# Patient Record
Sex: MALE | URBAN - METROPOLITAN AREA
[De-identification: names, ages, dates, MRNs, and addresses within clinical notes are randomized per-mention and may not be internally consistent; named-entity substitution may affect disease eponyms.]

---

## 2023-09-06 ENCOUNTER — OFFICE VISIT (OUTPATIENT)
Dept: PLASTIC SURGERY | Facility: CLINIC | Age: 33
End: 2023-09-06

## 2023-09-06 VITALS
SYSTOLIC BLOOD PRESSURE: 155 MMHG | DIASTOLIC BLOOD PRESSURE: 101 MMHG | HEART RATE: 88 BPM | TEMPERATURE: 98.2 F | HEIGHT: 68 IN | BODY MASS INDEX: 28.79 KG/M2 | WEIGHT: 190 LBS

## 2023-09-06 DIAGNOSIS — D17.21 LIPOMA OF BOTH UPPER EXTREMITIES: Primary | ICD-10-CM

## 2023-09-06 DIAGNOSIS — D17.22 LIPOMA OF BOTH UPPER EXTREMITIES: Primary | ICD-10-CM

## 2023-09-07 NOTE — PROGRESS NOTES
Assessment/Plan:    Patient is a 79-year-old male who presents as a new patient to our office for evaluation of several soft tissue masses of his bilateral upper extremities, lower extremities and trunk. Please see HPI. I discussed surgical excision of soft tissue masses of the left upper extremity x2, right upper extremity x3, and anterior trunk x2. Patient does have other soft tissue masses that he would like removed in the future. Patient understood and agreed. This will be done under general anesthesia. We will obtain an ultrasound of the bilateral elbows prior to surgery to assess for joint involvement. Discussed options, including forgoing surgery, as well as benefits and risks of surgery including but not limited to anesthesia, bleeding, infection, scarring and potential need for additional procedures. Consent was obtained and all questions answered to their satisfaction. We will plan for surgery at their earliest convenience. No problem-specific Assessment & Plan notes found for this encounter. Diagnoses and all orders for this visit:    Lipoma of both upper extremities  -     US MSK limited; Future          Subjective:      Patient ID: Dora Wheat is a 28 y.o. male. HPI     Patient presents to the office today as a new patient due to evaluation of multiple lipomas of the upper and lower extremities, and trunk. Patient reports that he has had multiple soft tissue masses for the past several years. He did have excision of some lipomas of the bilateral upper extremities approximately 15 years ago, but they returned. All lesions are nonpainful, though some are bothersome due to location. No drainage, no overlying skin changes. Patient is most bothered by 2 masses on his left upper extremity, near the left elbow, and 3 masses of the right upper extremity, also near the elbow, and 2 masses on the anterior trunk/upper abdomen. Please see photos in media.     On evaluation, the patient has multiple soft, semimobile soft tissue masses throughout his extremities and trunk. Please see photos in media. The following portions of the patient's history were reviewed and updated as appropriate:   He  has no past medical history on file. He   Patient Active Problem List    Diagnosis Date Noted   • Lipoma of both upper extremities 09/06/2023     He  has no past surgical history on file. His family history is not on file. He  reports that he has never smoked. He has never used smokeless tobacco. He reports current alcohol use. He reports that he does not use drugs. No current outpatient medications on file. No current facility-administered medications for this visit. No current outpatient medications on file prior to visit. No current facility-administered medications on file prior to visit. He has No Known Allergies. .    Review of Systems    A 12 point ROS was completed and is negative except as per HPI     Objective:      BP (!) 155/101   Pulse 88   Temp 98.2 °F (36.8 °C)   Ht 5' 8" (1.727 m)   Wt 86.2 kg (190 lb)   BMI 28.89 kg/m²          Physical Exam  Vitals and nursing note reviewed. Constitutional:       General: He is not in acute distress. Appearance: Normal appearance. He is normal weight. He is not ill-appearing, toxic-appearing or diaphoretic. HENT:      Head: Normocephalic and atraumatic. Nose: Nose normal.      Mouth/Throat:      Mouth: Mucous membranes are moist.   Eyes:      Extraocular Movements: Extraocular movements intact. Conjunctiva/sclera: Conjunctivae normal.      Pupils: Pupils are equal, round, and reactive to light. Cardiovascular:      Rate and Rhythm: Normal rate and regular rhythm. Pulmonary:      Effort: Pulmonary effort is normal. No respiratory distress. Breath sounds: Normal breath sounds. Abdominal:      General: Abdomen is flat. Palpations: Abdomen is soft.    Musculoskeletal:         General: No swelling, tenderness, deformity or signs of injury. Normal range of motion. Cervical back: Normal range of motion and neck supple. No rigidity or tenderness. Right lower leg: No edema. Left lower leg: No edema. Comments: See HPI   Skin:     General: Skin is warm and dry. Comments: See HPI   Neurological:      General: No focal deficit present. Mental Status: He is alert and oriented to person, place, and time. Cranial Nerves: No cranial nerve deficit. Sensory: No sensory deficit. Motor: No weakness.    Psychiatric:         Mood and Affect: Mood normal.         Behavior: Behavior normal.

## 2023-09-08 ENCOUNTER — PREP FOR PROCEDURE (OUTPATIENT)
Dept: PLASTIC SURGERY | Facility: CLINIC | Age: 33
End: 2023-09-08

## 2023-09-08 DIAGNOSIS — D17.1 LIPOMA OF SKIN AND SUBCUTANEOUS TISSUE OF TRUNK: ICD-10-CM

## 2023-09-08 DIAGNOSIS — D17.22 LIPOMA OF BOTH UPPER EXTREMITIES: Primary | ICD-10-CM

## 2023-09-08 DIAGNOSIS — D17.21 LIPOMA OF BOTH UPPER EXTREMITIES: Primary | ICD-10-CM

## 2023-10-23 ENCOUNTER — HOSPITAL ENCOUNTER (OUTPATIENT)
Dept: ULTRASOUND IMAGING | Facility: HOSPITAL | Age: 33
Discharge: HOME | End: 2023-10-23
Payer: COMMERCIAL

## 2023-10-23 DIAGNOSIS — D17.22 LIPOMA OF BOTH UPPER EXTREMITIES: ICD-10-CM

## 2023-10-23 DIAGNOSIS — D17.21 LIPOMA OF BOTH UPPER EXTREMITIES: ICD-10-CM

## 2023-10-23 PROCEDURE — 76882 US LMTD JT/FCL EVL NVASC XTR: CPT

## 2023-10-26 ENCOUNTER — APPOINTMENT (OUTPATIENT)
Dept: LAB | Facility: MEDICAL CENTER | Age: 33
End: 2023-10-26
Payer: COMMERCIAL

## 2023-10-26 DIAGNOSIS — D17.21 LIPOMA OF BOTH UPPER EXTREMITIES: ICD-10-CM

## 2023-10-26 DIAGNOSIS — D17.1 LIPOMA OF SKIN AND SUBCUTANEOUS TISSUE OF TRUNK: ICD-10-CM

## 2023-10-26 DIAGNOSIS — D17.22 LIPOMA OF BOTH UPPER EXTREMITIES: ICD-10-CM

## 2023-10-26 LAB
ANION GAP SERPL CALCULATED.3IONS-SCNC: 9 MMOL/L
BASOPHILS # BLD AUTO: 0.02 THOUSANDS/ÂΜL (ref 0–0.1)
BASOPHILS NFR BLD AUTO: 0 % (ref 0–1)
BUN SERPL-MCNC: 21 MG/DL (ref 5–25)
CALCIUM SERPL-MCNC: 9.8 MG/DL (ref 8.4–10.2)
CHLORIDE SERPL-SCNC: 104 MMOL/L (ref 96–108)
CO2 SERPL-SCNC: 26 MMOL/L (ref 21–32)
CREAT SERPL-MCNC: 1.25 MG/DL (ref 0.6–1.3)
EOSINOPHIL # BLD AUTO: 0.05 THOUSAND/ÂΜL (ref 0–0.61)
EOSINOPHIL NFR BLD AUTO: 1 % (ref 0–6)
ERYTHROCYTE [DISTWIDTH] IN BLOOD BY AUTOMATED COUNT: 12.2 % (ref 11.6–15.1)
GFR SERPL CREATININE-BSD FRML MDRD: 74 ML/MIN/1.73SQ M
GLUCOSE SERPL-MCNC: 112 MG/DL (ref 65–140)
HCT VFR BLD AUTO: 46 % (ref 36.5–49.3)
HGB BLD-MCNC: 14.7 G/DL (ref 12–17)
IMM GRANULOCYTES # BLD AUTO: 0.01 THOUSAND/UL (ref 0–0.2)
IMM GRANULOCYTES NFR BLD AUTO: 0 % (ref 0–2)
LYMPHOCYTES # BLD AUTO: 1.98 THOUSANDS/ÂΜL (ref 0.6–4.47)
LYMPHOCYTES NFR BLD AUTO: 44 % (ref 14–44)
MCH RBC QN AUTO: 28.1 PG (ref 26.8–34.3)
MCHC RBC AUTO-ENTMCNC: 32 G/DL (ref 31.4–37.4)
MCV RBC AUTO: 88 FL (ref 82–98)
MONOCYTES # BLD AUTO: 0.33 THOUSAND/ÂΜL (ref 0.17–1.22)
MONOCYTES NFR BLD AUTO: 7 % (ref 4–12)
NEUTROPHILS # BLD AUTO: 2.12 THOUSANDS/ÂΜL (ref 1.85–7.62)
NEUTS SEG NFR BLD AUTO: 48 % (ref 43–75)
NRBC BLD AUTO-RTO: 0 /100 WBCS
PLATELET # BLD AUTO: 220 THOUSANDS/UL (ref 149–390)
PMV BLD AUTO: 10.9 FL (ref 8.9–12.7)
POTASSIUM SERPL-SCNC: 4.1 MMOL/L (ref 3.5–5.3)
RBC # BLD AUTO: 5.24 MILLION/UL (ref 3.88–5.62)
SODIUM SERPL-SCNC: 139 MMOL/L (ref 135–147)
WBC # BLD AUTO: 4.51 THOUSAND/UL (ref 4.31–10.16)

## 2023-10-26 PROCEDURE — 80048 BASIC METABOLIC PNL TOTAL CA: CPT

## 2023-10-26 PROCEDURE — 85025 COMPLETE CBC W/AUTO DIFF WBC: CPT

## 2023-10-26 PROCEDURE — 36415 COLL VENOUS BLD VENIPUNCTURE: CPT

## 2023-10-31 RX ORDER — OMEGA-3/DHA/EPA/FISH OIL 60 MG-90MG
CAPSULE ORAL
COMMUNITY

## 2023-10-31 RX ORDER — LORATADINE 10 MG/1
10 TABLET ORAL DAILY
COMMUNITY

## 2023-10-31 NOTE — PRE-PROCEDURE INSTRUCTIONS
Pre-Surgery Instructions:   Medication Instructions    loratadine (CLARITIN) 10 mg tablet Uses PRN- OK to take day of surgery    Multiple Vitamin (MULTIVITAMIN ADULT PO) Stop taking 7 days prior to surgery. Omega-3 Fatty Acids (Fish Oil) 500 MG CAPS Stop taking 7 days prior to surgery. Medication instructions for day surgery reviewed. Please use only a sip of water to take your instructed medications. Avoid all over the counter vitamins, supplements and NSAIDS for one week prior to surgery per anesthesia guidelines. Tylenol is ok to take as needed. You will receive a call one business day prior to surgery with an arrival time and hospital directions. If your surgery is scheduled on a Monday, the hospital will be calling you on the Friday prior to your surgery. If you have not heard from anyone by 8pm, please call the hospital supervisor through the hospital  at [phone]. Dai Barry [phone]). Do not eat or drink anything after midnight the night before your surgery, including candy, mints, lifesavers, or chewing gum. Do not drink alcohol 24hrs before your surgery. Try not to smoke at least 24hrs before your surgery. Follow the pre surgery showering instructions as listed in the [facility] Surgical Experience Booklet” or otherwise provided by your surgeon's office. Do not use a blade to shave the surgical area 1 week before surgery. It is okay to use a clean electric clippers up to 24 hours before surgery. Do not apply any lotions, creams, including makeup, cologne, deodorant, or perfumes after showering on the day of your surgery. Do not use dry shampoo, hair spray, hair gel, or any type of hair products. No contact lenses, eye make-up, or artificial eyelashes. Remove nail polish, including gel polish, and any artificial, gel, or acrylic nails if possible. Remove all jewelry including rings and body piercing jewelry. Wear causal clothing that is easy to take on and off.  Consider your type of surgery. Keep any valuables, jewelry, piercings at home. Please bring any specially ordered equipment (sling, braces) if indicated. Arrange for a responsible person to drive you to and from the hospital on the day of your surgery. Visitor Guidelines discussed. Call the surgeon's office with any new illnesses, exposures, or additional questions prior to surgery. Please reference your [facility] Surgical Experience Booklet” for additional information to prepare for your upcoming surgery.

## 2023-11-06 ENCOUNTER — ANESTHESIA EVENT (OUTPATIENT)
Dept: PERIOP | Facility: HOSPITAL | Age: 33
End: 2023-11-06
Payer: COMMERCIAL

## 2023-11-06 PROCEDURE — NC001 PR NO CHARGE: Performed by: PHYSICIAN ASSISTANT

## 2023-11-07 ENCOUNTER — ANESTHESIA (OUTPATIENT)
Dept: PERIOP | Facility: HOSPITAL | Age: 33
End: 2023-11-07
Payer: COMMERCIAL

## 2023-11-07 ENCOUNTER — HOSPITAL ENCOUNTER (OUTPATIENT)
Facility: HOSPITAL | Age: 33
Setting detail: OUTPATIENT SURGERY
Discharge: HOME | End: 2023-11-07
Attending: STUDENT IN AN ORGANIZED HEALTH CARE EDUCATION/TRAINING PROGRAM | Admitting: STUDENT IN AN ORGANIZED HEALTH CARE EDUCATION/TRAINING PROGRAM
Payer: COMMERCIAL

## 2023-11-07 VITALS
HEIGHT: 68 IN | HEART RATE: 78 BPM | SYSTOLIC BLOOD PRESSURE: 150 MMHG | RESPIRATION RATE: 16 BRPM | TEMPERATURE: 98.4 F | WEIGHT: 189.6 LBS | DIASTOLIC BLOOD PRESSURE: 77 MMHG | BODY MASS INDEX: 28.73 KG/M2 | OXYGEN SATURATION: 97 %

## 2023-11-07 DIAGNOSIS — D17.1 LIPOMA OF SKIN AND SUBCUTANEOUS TISSUE OF TRUNK: ICD-10-CM

## 2023-11-07 DIAGNOSIS — D17.21 LIPOMA OF BOTH UPPER EXTREMITIES: ICD-10-CM

## 2023-11-07 DIAGNOSIS — D17.22 LIPOMA OF BOTH UPPER EXTREMITIES: ICD-10-CM

## 2023-11-07 PROCEDURE — 88304 TISSUE EXAM BY PATHOLOGIST: CPT | Performed by: PATHOLOGY

## 2023-11-07 PROCEDURE — 21552 EXC NECK LES SC 3 CM/>: CPT | Performed by: PHYSICIAN ASSISTANT

## 2023-11-07 PROCEDURE — 25075 EXC FOREARM LES SC < 3 CM: CPT | Performed by: PHYSICIAN ASSISTANT

## 2023-11-07 PROCEDURE — 24075 EXC ARM/ELBOW LES SC < 3 CM: CPT | Performed by: PHYSICIAN ASSISTANT

## 2023-11-07 PROCEDURE — 24075 EXC ARM/ELBOW LES SC < 3 CM: CPT | Performed by: STUDENT IN AN ORGANIZED HEALTH CARE EDUCATION/TRAINING PROGRAM

## 2023-11-07 PROCEDURE — 25075 EXC FOREARM LES SC < 3 CM: CPT | Performed by: STUDENT IN AN ORGANIZED HEALTH CARE EDUCATION/TRAINING PROGRAM

## 2023-11-07 PROCEDURE — 25071 EXC FOREARM LES SC 3 CM/>: CPT | Performed by: PHYSICIAN ASSISTANT

## 2023-11-07 PROCEDURE — 24071 EXC ARM/ELBOW LES SC 3 CM/>: CPT | Performed by: STUDENT IN AN ORGANIZED HEALTH CARE EDUCATION/TRAINING PROGRAM

## 2023-11-07 PROCEDURE — 22903 EXC ABD LES SC 3 CM/>: CPT | Performed by: STUDENT IN AN ORGANIZED HEALTH CARE EDUCATION/TRAINING PROGRAM

## 2023-11-07 PROCEDURE — 21552 EXC NECK LES SC 3 CM/>: CPT | Performed by: STUDENT IN AN ORGANIZED HEALTH CARE EDUCATION/TRAINING PROGRAM

## 2023-11-07 PROCEDURE — NC001 PR NO CHARGE: Performed by: STUDENT IN AN ORGANIZED HEALTH CARE EDUCATION/TRAINING PROGRAM

## 2023-11-07 PROCEDURE — 24071 EXC ARM/ELBOW LES SC 3 CM/>: CPT | Performed by: PHYSICIAN ASSISTANT

## 2023-11-07 PROCEDURE — 25071 EXC FOREARM LES SC 3 CM/>: CPT | Performed by: STUDENT IN AN ORGANIZED HEALTH CARE EDUCATION/TRAINING PROGRAM

## 2023-11-07 PROCEDURE — 22903 EXC ABD LES SC 3 CM/>: CPT | Performed by: PHYSICIAN ASSISTANT

## 2023-11-07 RX ORDER — ACETAMINOPHEN 325 MG/1
975 TABLET ORAL ONCE
Status: COMPLETED | OUTPATIENT
Start: 2023-11-07 | End: 2023-11-07

## 2023-11-07 RX ORDER — HYDROMORPHONE HCL/PF 1 MG/ML
SYRINGE (ML) INJECTION AS NEEDED
Status: DISCONTINUED | OUTPATIENT
Start: 2023-11-07 | End: 2023-11-07

## 2023-11-07 RX ORDER — GABAPENTIN 300 MG/1
300 CAPSULE ORAL ONCE
Status: COMPLETED | OUTPATIENT
Start: 2023-11-07 | End: 2023-11-07

## 2023-11-07 RX ORDER — MIDAZOLAM HYDROCHLORIDE 2 MG/2ML
INJECTION, SOLUTION INTRAMUSCULAR; INTRAVENOUS AS NEEDED
Status: DISCONTINUED | OUTPATIENT
Start: 2023-11-07 | End: 2023-11-07

## 2023-11-07 RX ORDER — KETOROLAC TROMETHAMINE 30 MG/ML
INJECTION, SOLUTION INTRAMUSCULAR; INTRAVENOUS AS NEEDED
Status: DISCONTINUED | OUTPATIENT
Start: 2023-11-07 | End: 2023-11-07

## 2023-11-07 RX ORDER — LIDOCAINE HYDROCHLORIDE 10 MG/ML
0.5 INJECTION, SOLUTION EPIDURAL; INFILTRATION; INTRACAUDAL; PERINEURAL ONCE AS NEEDED
Status: DISCONTINUED | OUTPATIENT
Start: 2023-11-07 | End: 2023-11-07 | Stop reason: HOSPADM

## 2023-11-07 RX ORDER — DEXAMETHASONE SODIUM PHOSPHATE 10 MG/ML
INJECTION, SOLUTION INTRAMUSCULAR; INTRAVENOUS AS NEEDED
Status: DISCONTINUED | OUTPATIENT
Start: 2023-11-07 | End: 2023-11-07

## 2023-11-07 RX ORDER — CEFAZOLIN SODIUM 2 G/50ML
2000 SOLUTION INTRAVENOUS ONCE
Status: COMPLETED | OUTPATIENT
Start: 2023-11-07 | End: 2023-11-07

## 2023-11-07 RX ORDER — HYDROMORPHONE HCL/PF 1 MG/ML
0.5 SYRINGE (ML) INJECTION
Status: CANCELLED | OUTPATIENT
Start: 2023-11-07

## 2023-11-07 RX ORDER — TRAMADOL HYDROCHLORIDE 50 MG/1
50 TABLET ORAL EVERY 6 HOURS PRN
Status: CANCELLED | OUTPATIENT
Start: 2023-11-07

## 2023-11-07 RX ORDER — LABETALOL HYDROCHLORIDE 5 MG/ML
10 INJECTION, SOLUTION INTRAVENOUS
Status: DISCONTINUED | OUTPATIENT
Start: 2023-11-07 | End: 2023-11-07 | Stop reason: HOSPADM

## 2023-11-07 RX ORDER — SODIUM CHLORIDE, SODIUM LACTATE, POTASSIUM CHLORIDE, CALCIUM CHLORIDE 600; 310; 30; 20 MG/100ML; MG/100ML; MG/100ML; MG/100ML
125 INJECTION, SOLUTION INTRAVENOUS CONTINUOUS
Status: DISCONTINUED | OUTPATIENT
Start: 2023-11-07 | End: 2023-11-07 | Stop reason: HOSPADM

## 2023-11-07 RX ORDER — ONDANSETRON 2 MG/ML
INJECTION INTRAMUSCULAR; INTRAVENOUS AS NEEDED
Status: DISCONTINUED | OUTPATIENT
Start: 2023-11-07 | End: 2023-11-07

## 2023-11-07 RX ORDER — FENTANYL CITRATE 50 UG/ML
INJECTION, SOLUTION INTRAMUSCULAR; INTRAVENOUS AS NEEDED
Status: DISCONTINUED | OUTPATIENT
Start: 2023-11-07 | End: 2023-11-07

## 2023-11-07 RX ORDER — ONDANSETRON 2 MG/ML
4 INJECTION INTRAMUSCULAR; INTRAVENOUS ONCE AS NEEDED
Status: CANCELLED | OUTPATIENT
Start: 2023-11-07

## 2023-11-07 RX ORDER — LIDOCAINE HYDROCHLORIDE 20 MG/ML
INJECTION, SOLUTION EPIDURAL; INFILTRATION; INTRACAUDAL; PERINEURAL AS NEEDED
Status: DISCONTINUED | OUTPATIENT
Start: 2023-11-07 | End: 2023-11-07

## 2023-11-07 RX ORDER — PROPOFOL 10 MG/ML
INJECTION, EMULSION INTRAVENOUS AS NEEDED
Status: DISCONTINUED | OUTPATIENT
Start: 2023-11-07 | End: 2023-11-07

## 2023-11-07 RX ORDER — TRAMADOL HYDROCHLORIDE 50 MG/1
50 TABLET ORAL EVERY 8 HOURS PRN
Qty: 15 TABLET | Refills: 0 | Status: SHIPPED | OUTPATIENT
Start: 2023-11-07

## 2023-11-07 RX ADMIN — KETOROLAC TROMETHAMINE 15 MG: 30 INJECTION, SOLUTION INTRAMUSCULAR; INTRAVENOUS at 09:01

## 2023-11-07 RX ADMIN — ACETAMINOPHEN 975 MG: 325 TABLET ORAL at 06:30

## 2023-11-07 RX ADMIN — HYDROMORPHONE HYDROCHLORIDE 0.5 MG: 1 INJECTION, SOLUTION INTRAMUSCULAR; INTRAVENOUS; SUBCUTANEOUS at 08:51

## 2023-11-07 RX ADMIN — FENTANYL CITRATE 50 MCG: 50 INJECTION, SOLUTION INTRAMUSCULAR; INTRAVENOUS at 07:51

## 2023-11-07 RX ADMIN — PROPOFOL 250 MG: 10 INJECTION, EMULSION INTRAVENOUS at 07:39

## 2023-11-07 RX ADMIN — MIDAZOLAM 2 MG: 1 INJECTION INTRAMUSCULAR; INTRAVENOUS at 07:33

## 2023-11-07 RX ADMIN — GABAPENTIN 300 MG: 300 CAPSULE ORAL at 06:30

## 2023-11-07 RX ADMIN — LABETALOL HYDROCHLORIDE 10 MG: 5 INJECTION, SOLUTION INTRAVENOUS at 10:45

## 2023-11-07 RX ADMIN — CEFAZOLIN SODIUM 2000 MG: 2 SOLUTION INTRAVENOUS at 07:45

## 2023-11-07 RX ADMIN — SODIUM CHLORIDE, SODIUM LACTATE, POTASSIUM CHLORIDE, AND CALCIUM CHLORIDE: .6; .31; .03; .02 INJECTION, SOLUTION INTRAVENOUS at 07:36

## 2023-11-07 RX ADMIN — ONDANSETRON 4 MG: 2 INJECTION INTRAMUSCULAR; INTRAVENOUS at 09:01

## 2023-11-07 RX ADMIN — FENTANYL CITRATE 50 MCG: 50 INJECTION, SOLUTION INTRAMUSCULAR; INTRAVENOUS at 07:39

## 2023-11-07 RX ADMIN — PROPOFOL 70 MG: 10 INJECTION, EMULSION INTRAVENOUS at 07:45

## 2023-11-07 RX ADMIN — HYDROMORPHONE HYDROCHLORIDE 0.5 MG: 1 INJECTION, SOLUTION INTRAMUSCULAR; INTRAVENOUS; SUBCUTANEOUS at 08:59

## 2023-11-07 RX ADMIN — LIDOCAINE HYDROCHLORIDE 100 MG: 20 INJECTION, SOLUTION EPIDURAL; INFILTRATION; INTRACAUDAL; PERINEURAL at 07:39

## 2023-11-07 RX ADMIN — DEXAMETHASONE SODIUM PHOSPHATE 10 MG: 10 INJECTION, SOLUTION INTRAMUSCULAR; INTRAVENOUS at 07:47

## 2023-11-07 NOTE — DISCHARGE INSTR - AVS FIRST PAGE
Surgery Date: 11/7/2023                Patient: Jared Carrillo  Surgeon: Dr. Carrie Brown     Postoperative Instructions for Outpatient Surgery  Excision of Lesion/Mass     Dressings:  [x] Skin glue was applied to your incision over absorbable sutures. You may feel small pieces of suture at the ends of your incision. [] Incision is closed with nonabsorbable sutures. [x] Remove dressing the first morning following your surgery and bathe as directed. [x] No dressings are required but you may cover the incision with band-aid or gauze for comfort.  [] Apply bacitracin or other antibiotic ointment to incision and cover with band-aid or gauze. [] Leave dressing in place until your follow up appointment. [] Other instructions:      Bathing:  [x] Shower 24 hours after surgery. Allow soap and water to gently wash over the incision. No scrubbing. Gently pat dry and apply dressing as needed/instructed above.  [] Keep incision/dressing dry until your follow up appointment. [x] No submerging incision in bathtub, pool, hot tub and/or lake. Activity:  [x] No heavy lifting (> 10lbs). [x] No strenuous exercise.  [] Walking is permitted and encouraged. [] Strict sun avoidance/protection of incision site. [] Other instructions:      Medication:  [x] Resume preoperative medications. [x] Ok to use Tylenol 650 every 8 hours for pain control. You may also use ibuprofen 48 hours after surgery. Add Tramadol as needed. [] Finish all antibiotics as prescribed.  [] You may not drive until off your pain medications. [x] Apply ice to area as needed for pain. Do not place ice directly on skin. [] Other instructions: It is expected to have some bruising, swelling and mild oozing at the incision site and the surrounding area. If there is more than you expect or you suspect an infection, please call the office. Some patients may experience a low-grade fever after surgery. If it is above 100.4, please call the office. If you do not have a postoperative office appointment scheduled, please call the office today and let the staff know Dr. Dannie Rivero PA needs to see you in 10-14  days. Please call [phone] with any questions, concerns or changes.

## 2023-11-07 NOTE — OP NOTE
OPERATIVE REPORT  PATIENT NAME: Colin Rutherford    :  1990  MRN: [id number]  Pt Location: [facility] OR ROOM 01    SURGERY DATE: 2023    Surgeon(s) and Role:     * Phill Glover MD - Primary     * Mckenna Hernandez PA-C - Assisting    Preop Diagnosis:  Lipoma of both upper extremities [D17.21, D17.22]  Lipoma of skin and subcutaneous tissue of trunk [D17.1]    Post-Op Diagnosis Codes:     * Lipoma of both upper extremities [D17.21, D17.22]     * Lipoma of skin and subcutaneous tissue of trunk [D17.1]    Procedure(s):  Excision of multiple,deep subcutaneous soft tissue tumors (lipomas) on bilateral upper extremities and anterior trunk all through separate incisions with sizes as noted below  -Right upper extremity:    -Lesion 1: forearm, size 3.2x2.4 cm    -Lesion 2: forearm, size 3.1x2.8 cm   -Lesion 3: forearm, size 2.8x1.8 cm   -Lesion 4: forearm, size 4.3x2.8 cm   -Lesion 5: forearm, size 5.2x3.8 cm   -Lesion 6: elbow, size 2.2x1.2 cm  -Left upper extremity:   -Lesion 1: forearm, size 5.1x3.8 cm   -Lesion 2: forearm, size 3.4x3.5 cm   -Lesion 3: forearm, size 3.0x2.9 cm   -Lesion 4: forearm, size 2.1x1.4 cm   -Lesion 5: elbow: multiloculated, removed in pieces (size 4.3x3.5 cm, 3.1x2.4 cm, 4.6x3.4 cm, total size 12. 0x9.3 cm)   -Lesion 6: forearm: size 2.1x1.5 cm  -Anterior trunk:   -Lesion 1: size 3.8x3.2 cm  -Lesion 1: size 3.5x2.9 cm    Specimen(s):  ID Type Source Tests Collected by Time Destination   1 : Masses x 6 right forearm Tissue Soft Tissue, Other TISSUE EXAM Phill Glover MD 2023 0825    2 : Masses abdomen x 2 Tissue Soft Tissue, Other TISSUE EXAM Phill Glover MD 2023 0831    3 : Masses x 9 left forearm Tissue Soft Tissue, Other TISSUE EXAM Phill Glover MD 2023 2311        Estimated Blood Loss:   Minimal    Drains:  * No LDAs found *    Anesthesia Type:   General    Operative Indications:  Lipoma of both upper extremities [D17.21, D17.22]  Lipomas of skin and subcutaneous tissue of trunk [D17.1]      Operative Findings:  Multiple well demarcated deep subcutaneous soft tissue tumors (lipomas) with noted sizes above located in the elbow region, forearms, and anterior trunk  Total of 30 cc of 1% lidocaine with epi was utilized     Complications:   None    Procedure and Technique:  Patient was brought to the operating room, transferred to the operating table in supine fashion. After undergoing general anesthesia, a timeout was performed at which point all patient identifiers were deemed to be correct. The abdomen and bilateral upper extremities were prepped and draped in the normal sterile fashion. I first began by marking a longitudinal incision overlying each of the lesions. A total of 30 cc of 1% lidocaine with epi was used in total to locally infiltrate all of the incisions. After allowing for the epi to take effect, incisions were made and dissection through skin, superficial and deep subcutaneous tissue, until the soft tissue tumors were encountered which were all well-demarcated lipomas. The lipomas were dissected free from surrounding tissue and removed and sent for routine pathology. Each of the operative fields were irrigated and hemostasis achieved with electrocautery. I then proceeded with closure with 3-0 vicryl for dermis, 4-0 monocryl for skin, followed by exofin skin glue. The sizes and locations of each of the deep subcutaneous soft tissue tumors (lipomas) are noted above. This concluded the procedure. Patient tolerated the procedure well without complications. At the end of the case, all sponge, needle, and instrument counts were correct. Patient was awakened from anesthesia and taken to the PACU in stable condition.     I was present for the entire procedure, A qualified resident physician was not available and A physician assistant was required during the procedure for retraction, tissue handling, dissection and suturing       I was present for the entire procedure.     Patient Disposition:  PACU     This procedure was not performed to treat primary cutaneous melanoma through wide local excision      SIGNATURE: Sammy Kendrick MD  DATE: November 7, 2023  TIME: 9:58 AM

## 2023-11-07 NOTE — ANESTHESIA POSTPROCEDURE EVALUATION
Post-Op Assessment Note    CV Status:  Stable  Pain Score: 0    Pain management: adequate  Multimodal analgesia used between 6 hours prior to anesthesia start to PACU discharge    Mental Status:  Awake   Hydration Status:  Stable   PONV Controlled:  None   Airway Patency:  Patent      Post Op Vitals Reviewed: Yes      Staff: CRNA         No notable events documented.     BP   168/78   Temp 98.4   Pulse 98   Resp 12   SpO2 99%

## 2023-11-07 NOTE — ANESTHESIA PREPROCEDURE EVALUATION
Procedure:  EXCISION OF SOFT TISSUE MASS OF THE BILATERAL FOREARM X 5 WITH COMPLEX CLOSURE. (Bilateral: Arm)  EXCISION ANTERIOR TRUNK X 2  WITH COMPLEX CLOSURE. (Chest)    Relevant Problems   No relevant active problems        Physical Exam    Airway    Mallampati score: II  TM Distance: >3 FB  Neck ROM: full     Dental   No notable dental hx     Cardiovascular      Pulmonary      Other Findings        Anesthesia Plan  ASA Score- 1     Anesthesia Type- general with ASA Monitors. Additional Monitors:     Airway Plan: LMA. Plan Factors-Exercise tolerance (METS): <4 METS. Chart reviewed. Patient is a current smoker (Marijuana daily). Induction- intravenous. Postoperative Plan-     Informed Consent- Anesthetic plan and risks discussed with patient and mother. I personally reviewed this patient with the CRNA. Discussed and agreed on the Anesthesia Plan with the CRNA. Chiqui Simms

## 2023-11-07 NOTE — H&P
H&P - Plastic Surgery   Darlyn Ann 28 y.o. male MRN: [id number]  Unit/Bed#:  Encounter: [id number]           Assessment:  Lipoma of both upper extremities [D17.21, D17.22]       Lipoma of skin and subcutaneous tissue of trunk [D17.1]   Plan:   EXCISION OF SOFT TISSUE MASS OF THE BILATERAL FOREARM X 5 WITH COMPLEX CLOSURE. (Bilateral: Arm)      EXCISION ANTERIOR TRUNK X 2  WITH COMPLEX CLOSURE. (Chest)   Anesthesia type: General           HPI:   Darlyn Ann is a 28y.o. year old male who presents with multiple lipomas of the upper and lower extremities, and trunk. Patient reports that he has had multiple soft tissue masses for the past several years. He did have excision of some lipomas of the bilateral upper extremities approximately 15 years ago, but they returned. All lesions are nonpainful, though some are bothersome due to location. No drainage, no overlying skin changes. Patient is most bothered by 2 masses on his left upper extremity, near the left elbow, and 3 masses of the right upper extremity, also near the elbow, and 2 masses on the anterior trunk/upper abdomen. REVIEW OF SYSTEMS    GENERAL/CONSTITUTIONAL: The patient denies fever, fatigue, weakness, weight gain or weight loss. HEAD, EYES, EARS, NOSE AND THROAT: Eyes - The patient denies pain, redness, loss of vision, double or blurred vision and denies wearing glasses. The patient denies ringing in the ears, nosebleeds sinusitis, post nasal drip. Also denies frequent sore throats, hoarseness, painful swallowing. CARDIOVASCULAR: The patient denies chest pain, irregular heartbeats, palpitations, shortness of breath, heart murmurs, high blood pressure, cramps in his legs with walking, pain in his feet or toes at night or varicose veins. RESPIRATORY: The patient denies chronic cough, wheezing or night sweats.   GASTROINTESTINAL: The patient denies decreased appetite, nausea, vomiting, diarrhea, constipation, blood in the stools. GENITOURINARY: The patient denies difficult urination, pain or burning with urination, blood in the urine. MUSCULOSKELETAL: The patient denies arm, thigh or calf cramps. No joint or muscle pain. No muscle weakness or tenderness. No joint swelling, neck pain, back pain or major orthopedic injuries. SKIN AND BREASTS: see hpi The patient denies easy bruising, skin redness, skin rash, hives. NEUROLOGIC: The patient denies headache, dizziness, fainting, memory loss. PSYCHIATRIC: The patient denies depression anxiety. ENDOCRINE: The patient denies intolerance to hot or cold temperature, flushing, fingernail changes, increased thirst, increased salt intake or decreased sexual desire. HEMATOLOGIC/LYMPHATIC: The patient denies anemia, bleeding tendency or clotting tendency. ALLERGIC/IMMUNOLOGIC: The patient denies rhinitis, asthma, skin sensitivity, latex allergies or sensitivity. Historical Information   Past Medical History:   Diagnosis Date    Kidney stone     Seasonal allergies      Past Surgical History:   Procedure Laterality Date    MYRINGOTOMY W/ TUBES      WISDOM TOOTH EXTRACTION       Social History   Social History     Substance and Sexual Activity   Alcohol Use Not Currently    Comment: rarely     Social History     Substance and Sexual Activity   Drug Use Yes    Frequency: 7.0 times per week    Types: Marijuana     Social History     Tobacco Use   Smoking Status Never   Smokeless Tobacco Never     Family History: History reviewed. No pertinent family history. Meds/Allergies   No current facility-administered medications for this encounter.     Current Outpatient Medications:     loratadine (CLARITIN) 10 mg tablet, Take 10 mg by mouth daily, Disp: , Rfl:     Multiple Vitamin (MULTIVITAMIN ADULT PO), Take by mouth, Disp: , Rfl:     Omega-3 Fatty Acids (Fish Oil) 500 MG CAPS, Take by mouth, Disp: , Rfl:        Objective     BP (!) 155/101   Pulse 88   Temp 98.2 °F (36.8 °C)   Ht 5' 8" (1.727 m)   Wt 86.2 kg (190 lb)   BMI 28.89 kg/m²             Physical Exam  Vitals and nursing note reviewed. Constitutional:       General: He is not in acute distress. Appearance: Normal appearance. He is normal weight. He is not ill-appearing, toxic-appearing or diaphoretic. HENT:      Head: Normocephalic and atraumatic. Nose: Nose normal.      Mouth/Throat:      Mouth: Mucous membranes are moist.   Eyes:      Extraocular Movements: Extraocular movements intact. Conjunctiva/sclera: Conjunctivae normal.      Pupils: Pupils are equal, round, and reactive to light. Cardiovascular:      Rate and Rhythm: Normal rate and regular rhythm. Pulmonary:      Effort: Pulmonary effort is normal. No respiratory distress. Breath sounds: Normal breath sounds. Abdominal:      General: Abdomen is flat. Palpations: Abdomen is soft. Musculoskeletal:         General: No swelling, tenderness, deformity or signs of injury. Normal range of motion. Cervical back: Normal range of motion and neck supple. No rigidity or tenderness. Right lower leg: No edema. Left lower leg: No edema. Comments: See HPI   Skin:     General: Skin is warm and dry. Comments: See HPI   Neurological:      General: No focal deficit present. Mental Status: He is alert and oriented to person, place, and time. Cranial Nerves: No cranial nerve deficit. Sensory: No sensory deficit. Motor: No weakness. Psychiatric:         Mood and Affect: Mood normal.         Behavior: Behavior normal.     Lab Results:   Lab Results   Component Value Date    WBC 4.51 10/26/2023    HGB 14.7 10/26/2023    HCT 46.0 10/26/2023    MCV 88 10/26/2023     10/26/2023          No results found for: "TISSUECULT", "WOUNDCULT"      Imaging Studies:   No results found.     EKG, Pathology, and Other Studies:   No results found for: "FINALDX"    No intake or output data in the 24 hours ending 11/06/23 1944    Invasive Devices       None                   VTE Prophylaxis: Sequential compression device (Venodyne)

## 2023-11-08 ENCOUNTER — TELEPHONE (OUTPATIENT)
Dept: PLASTIC SURGERY | Facility: CLINIC | Age: 33
End: 2023-11-08

## 2023-11-10 PROCEDURE — 88304 TISSUE EXAM BY PATHOLOGIST: CPT | Performed by: PATHOLOGY

## 2023-11-22 ENCOUNTER — OFFICE VISIT (OUTPATIENT)
Dept: PLASTIC SURGERY | Facility: CLINIC | Age: 33
End: 2023-11-22

## 2023-11-22 DIAGNOSIS — D17.1 LIPOMA OF SKIN AND SUBCUTANEOUS TISSUE OF TRUNK: ICD-10-CM

## 2023-11-22 DIAGNOSIS — D17.22 LIPOMA OF BOTH UPPER EXTREMITIES: Primary | ICD-10-CM

## 2023-11-22 DIAGNOSIS — D17.21 LIPOMA OF BOTH UPPER EXTREMITIES: Primary | ICD-10-CM

## 2023-11-22 PROCEDURE — 99024 POSTOP FOLLOW-UP VISIT: CPT | Performed by: PHYSICIAN ASSISTANT

## 2023-11-22 NOTE — PROGRESS NOTES
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 Brian Lazaro on 11/7/23. Please see HPI. Patient returns to the office today for first postoperative visit. He has had no issues postoperatively. Intraoperative pathology was reviewed with the patient and was as follows:    Final Diagnosis   A. Soft Tissue, Other, Masses x 6 right forearm:  -Multiple fragments of  mature adipocytes and branching capillary size  vessels with occasional fibrin thrombi, consistent with angiolipoma, measuring in aggregate of 8.2 x 6.2 x 1.8 cm     B. Soft Tissue, Other, Masses abdomen x 2, resection:   -Fragments of  mature adipose tissue , consistent with lipoma 4.8 x 3.4 x 1.4 cm. C. Soft Tissue, Other, Masses x 9 left forearm:   Multiple fragments of  mature adipocytes and branching capillary size  vessels with occasional fibrin thrombi, consistent with angiolipoma, measuring in aggregate , 7.6 x 7 x 2.2 cm     Patient may begin silicone scar treatment in approximately 1 week. He will return to our office in approximately 2 weeks for an incision check or sooner with any questions or concerns. There are no diagnoses linked to this encounter. Subjective:     Patient ID: Long Cuevas is a 28 y.o. male. HPI    Patient reports no issues or concerns. He has been doing well. Review of Systems    See HPI    Objective:     Physical Exam      All incisions are clean, dry, intact and healing appropriately.
No

## 2023-12-01 ENCOUNTER — OFFICE VISIT (OUTPATIENT)
Dept: PLASTIC SURGERY | Facility: CLINIC | Age: 33
End: 2023-12-01

## 2023-12-01 DIAGNOSIS — D17.22 LIPOMA OF BOTH UPPER EXTREMITIES: ICD-10-CM

## 2023-12-01 DIAGNOSIS — D17.1 LIPOMA OF SKIN AND SUBCUTANEOUS TISSUE OF TRUNK: Primary | ICD-10-CM

## 2023-12-01 DIAGNOSIS — D17.21 LIPOMA OF BOTH UPPER EXTREMITIES: ICD-10-CM

## 2023-12-01 RX ORDER — GABAPENTIN 100 MG/1
100 CAPSULE ORAL 3 TIMES DAILY PRN
Qty: 30 CAPSULE | Refills: 0 | Status: SHIPPED | OUTPATIENT
Start: 2023-12-01 | End: 2024-11-30

## 2023-12-01 NOTE — PROGRESS NOTES
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 Jennifer Weiss on 11/7/23. Please see HPI. Patient returns to the office today for an incision check. All incisions are clean, dry, intact, healing appropriately with minimal scarring with exception of 1 small spitting suture which was removed today. Patient will continue with silicone scar treatment. He we will follow-up in the office as needed per patient request.  Of note, the patient is stating that he has a shooting aching pain that originates in his posterior elbow and down into his lateral forearm. I will give him a 10-day supply of gabapentin for nerve pain. Diagnoses and all orders for this visit:    Lipoma of skin and subcutaneous tissue of trunk  -     gabapentin (Neurontin) 100 mg capsule; Take 1 capsule (100 mg total) by mouth 3 (three) times a day as needed (pain)    Lipoma of both upper extremities  -     gabapentin (Neurontin) 100 mg capsule; Take 1 capsule (100 mg total) by mouth 3 (three) times a day as needed (pain)          Subjective:     Patient ID: Eliana Wheat is a 28 y.o. male. HPI    Patient reports no issues or concerns except as above. He is interested in further surgical excision of angiolipomas but wishes to wait to schedule at this time. Review of Systems    See HPI     Objective:     Physical Exam      All areas are clean, dry and intact except small, shallow spitting suture as above. No tracking, tunneling, wound dehiscence.

## 2024-05-30 ENCOUNTER — OFFICE VISIT (OUTPATIENT)
Dept: FAMILY MEDICINE CLINIC | Facility: CLINIC | Age: 34
End: 2024-05-30
Payer: COMMERCIAL

## 2024-05-30 VITALS
OXYGEN SATURATION: 99 % | WEIGHT: 192 LBS | BODY MASS INDEX: 28.44 KG/M2 | HEIGHT: 69 IN | TEMPERATURE: 97.2 F | DIASTOLIC BLOOD PRESSURE: 92 MMHG | HEART RATE: 77 BPM | SYSTOLIC BLOOD PRESSURE: 138 MMHG

## 2024-05-30 DIAGNOSIS — Z13.220 SCREENING FOR LIPID DISORDERS: ICD-10-CM

## 2024-05-30 DIAGNOSIS — I10 ESSENTIAL HYPERTENSION: Primary | ICD-10-CM

## 2024-05-30 DIAGNOSIS — Z13.29 SCREENING FOR THYROID DISORDER: ICD-10-CM

## 2024-05-30 DIAGNOSIS — Z13.1 SCREENING FOR DIABETES MELLITUS: ICD-10-CM

## 2024-05-30 DIAGNOSIS — Z13.0 SCREENING FOR DEFICIENCY ANEMIA: ICD-10-CM

## 2024-05-30 PROCEDURE — 99204 OFFICE O/P NEW MOD 45 MIN: CPT

## 2024-05-30 RX ORDER — DIPHENOXYLATE HYDROCHLORIDE AND ATROPINE SULFATE 2.5; .025 MG/1; MG/1
1 TABLET ORAL DAILY
COMMUNITY

## 2024-05-30 RX ORDER — LISINOPRIL 10 MG/1
10 TABLET ORAL DAILY
Qty: 30 TABLET | Refills: 0 | Status: SHIPPED | OUTPATIENT
Start: 2024-05-30

## 2024-05-30 NOTE — PROGRESS NOTES
Ambulatory Visit  Name: Arjun Santos      : 1990      MRN: [id number]  Encounter Provider: Bhavya Bui PA-C  Encounter Date: 2024   Encounter department: [facility]    Assessment & Plan   1. Essential hypertension  Assessment & Plan:  Patient is establishing care with our practice today. He has had struggles with his blood pressure for several months. He has been seeing urology for kidney stones, and at all of his appointments he has had elevated Bps - 150/100s. Blood pressure is elevated on today's exam as well, will treat for essential HTN. Patient has no headaches, vision changes, chest pain, SOB, or leg swelling. He does have a family history of HTN. Will start lisniopril 10mg and instructed patient to take his BP at home at least once a day and bring his readings in to his next visit along with his machine so we can compare. Counseled on ADRs of lisinopril including hypotension symptoms, dry cough, and angioedema. Patient understands side effects and is agreeable to trying this medication. Counseled on watching salt intake and increasing exercise to help the blood pressure naturally decrease. Recommend 2 week follow up to reassess blood pressure, will determine if dose increase is needed. Patient to obtain fasting labs prior to follow up. Patient is very agreeable with plan today, all questions answered.   Orders:  -     lisinopril (ZESTRIL) 10 mg tablet; Take 1 tablet (10 mg total) by mouth daily  2. Screening for deficiency anemia  -     CBC and differential; Future; Expected date: 2024  3. Screening for diabetes mellitus  -     Comprehensive metabolic panel; Future; Expected date: 2024  4. Screening for thyroid disorder  -     TSH, 3rd generation with Free T4 reflex; Future; Expected date: 2024  5. Screening for lipid disorders  -     Lipid Panel with Direct LDL reflex; Future; Expected date: 2024       History of Present Illness     Patient  "presents today to establish care with our practice. He has a concern today of high blood pressures for the past few months. He has been seeing urology every so often for kidney stones, and his BP has been running in the 150s/100s range each time he goes. He does have a family history of HTN. He has never been treated for HTN before. He has no symptoms from his blood pressure, no headaches, chest pain, SOB, vision changes, or palpitations.     Hypertension  This is a new problem. Pertinent negatives include no chest pain, headaches, palpitations or shortness of breath.       Review of Systems   Constitutional:  Negative for chills, fatigue and fever.   Eyes:  Negative for visual disturbance.   Respiratory:  Negative for cough, chest tightness and shortness of breath.    Cardiovascular:  Negative for chest pain, palpitations and leg swelling.   Gastrointestinal:  Negative for abdominal pain, diarrhea, nausea and vomiting.   Genitourinary:  Negative for difficulty urinating, dysuria and hematuria.   Neurological:  Negative for dizziness, seizures, syncope, light-headedness and headaches.   Psychiatric/Behavioral:  Negative for behavioral problems and confusion. The patient is not nervous/anxious.    All other systems reviewed and are negative.      Objective     /92   Pulse 77   Temp (!) 97.2 °F (36.2 °C)   Ht 5' 9.29\" (1.76 m)   Wt 87.1 kg (192 lb)   SpO2 99%   BMI 28.12 kg/m²     Physical Exam  Vitals and nursing note reviewed.   Constitutional:       General: He is not in acute distress.     Appearance: Normal appearance. He is normal weight. He is not ill-appearing.   HENT:      Head: Normocephalic and atraumatic.   Cardiovascular:      Rate and Rhythm: Normal rate and regular rhythm.      Pulses: Normal pulses.      Heart sounds: No murmur heard.  Pulmonary:      Effort: Pulmonary effort is normal. No respiratory distress.      Breath sounds: Normal breath sounds.   Musculoskeletal:      Cervical back: " Normal range of motion and neck supple.      Right lower leg: No edema.      Left lower leg: No edema.   Skin:     General: Skin is warm and dry.   Neurological:      General: No focal deficit present.      Mental Status: He is alert and oriented to person, place, and time. Mental status is at baseline.   Psychiatric:         Mood and Affect: Mood normal.         Behavior: Behavior normal.         Judgment: Judgment normal.       Administrative Statements     Bhavya Bui PA-C  [facility]

## 2024-05-30 NOTE — ASSESSMENT & PLAN NOTE
Patient is establishing care with our practice today. He has had struggles with his blood pressure for several months. He has been seeing urology for kidney stones, and at all of his appointments he has had elevated Bps - 150/100s. Blood pressure is elevated on today's exam as well, will treat for essential HTN. Patient has no headaches, vision changes, chest pain, SOB, or leg swelling. He does have a family history of HTN. Will start lisniopril 10mg and instructed patient to take his BP at home at least once a day and bring his readings in to his next visit along with his machine so we can compare. Counseled on ADRs of lisinopril including hypotension symptoms, dry cough, and angioedema. Patient understands side effects and is agreeable to trying this medication. Counseled on watching salt intake and increasing exercise to help the blood pressure naturally decrease. Recommend 2 week follow up to reassess blood pressure, will determine if dose increase is needed. Patient to obtain fasting labs prior to follow up. Patient is very agreeable with plan today, all questions answered.

## 2024-06-08 ENCOUNTER — APPOINTMENT (OUTPATIENT)
Dept: LAB | Facility: CLINIC | Age: 34
End: 2024-06-08
Payer: COMMERCIAL

## 2024-06-08 DIAGNOSIS — Z13.0 SCREENING FOR DEFICIENCY ANEMIA: ICD-10-CM

## 2024-06-08 DIAGNOSIS — Z13.1 SCREENING FOR DIABETES MELLITUS: ICD-10-CM

## 2024-06-08 DIAGNOSIS — Z13.29 SCREENING FOR THYROID DISORDER: ICD-10-CM

## 2024-06-08 DIAGNOSIS — Z13.220 SCREENING FOR LIPID DISORDERS: ICD-10-CM

## 2024-06-08 LAB
ALBUMIN SERPL BCP-MCNC: 4.3 G/DL (ref 3.5–5)
ALP SERPL-CCNC: 54 U/L (ref 34–104)
ALT SERPL W P-5'-P-CCNC: 23 U/L (ref 7–52)
ANION GAP SERPL CALCULATED.3IONS-SCNC: 6 MMOL/L (ref 4–13)
AST SERPL W P-5'-P-CCNC: 19 U/L (ref 13–39)
BASOPHILS # BLD AUTO: 0.03 THOUSANDS/ÂΜL (ref 0–0.1)
BASOPHILS NFR BLD AUTO: 1 % (ref 0–1)
BILIRUB SERPL-MCNC: 0.46 MG/DL (ref 0.2–1)
BUN SERPL-MCNC: 19 MG/DL (ref 5–25)
CALCIUM SERPL-MCNC: 9.2 MG/DL (ref 8.4–10.2)
CHLORIDE SERPL-SCNC: 107 MMOL/L (ref 96–108)
CHOLEST SERPL-MCNC: 181 MG/DL
CO2 SERPL-SCNC: 27 MMOL/L (ref 21–32)
CREAT SERPL-MCNC: 1.19 MG/DL (ref 0.6–1.3)
EOSINOPHIL # BLD AUTO: 0.06 THOUSAND/ÂΜL (ref 0–0.61)
EOSINOPHIL NFR BLD AUTO: 1 % (ref 0–6)
ERYTHROCYTE [DISTWIDTH] IN BLOOD BY AUTOMATED COUNT: 12.4 % (ref 11.6–15.1)
GFR SERPL CREATININE-BSD FRML MDRD: 79 ML/MIN/1.73SQ M
GLUCOSE P FAST SERPL-MCNC: 92 MG/DL (ref 65–99)
HCT VFR BLD AUTO: 44.8 % (ref 36.5–49.3)
HDLC SERPL-MCNC: 48 MG/DL
HGB BLD-MCNC: 14.2 G/DL (ref 12–17)
IMM GRANULOCYTES # BLD AUTO: 0.01 THOUSAND/UL (ref 0–0.2)
IMM GRANULOCYTES NFR BLD AUTO: 0 % (ref 0–2)
LDLC SERPL CALC-MCNC: 116 MG/DL (ref 0–100)
LYMPHOCYTES # BLD AUTO: 2.04 THOUSANDS/ÂΜL (ref 0.6–4.47)
LYMPHOCYTES NFR BLD AUTO: 43 % (ref 14–44)
MCH RBC QN AUTO: 28.6 PG (ref 26.8–34.3)
MCHC RBC AUTO-ENTMCNC: 31.7 G/DL (ref 31.4–37.4)
MCV RBC AUTO: 90 FL (ref 82–98)
MONOCYTES # BLD AUTO: 0.34 THOUSAND/ÂΜL (ref 0.17–1.22)
MONOCYTES NFR BLD AUTO: 7 % (ref 4–12)
NEUTROPHILS # BLD AUTO: 2.22 THOUSANDS/ÂΜL (ref 1.85–7.62)
NEUTS SEG NFR BLD AUTO: 48 % (ref 43–75)
NRBC BLD AUTO-RTO: 0 /100 WBCS
PLATELET # BLD AUTO: 203 THOUSANDS/UL (ref 149–390)
PMV BLD AUTO: 11.4 FL (ref 8.9–12.7)
POTASSIUM SERPL-SCNC: 4.4 MMOL/L (ref 3.5–5.3)
PROT SERPL-MCNC: 6.8 G/DL (ref 6.4–8.4)
RBC # BLD AUTO: 4.96 MILLION/UL (ref 3.88–5.62)
SODIUM SERPL-SCNC: 140 MMOL/L (ref 135–147)
TRIGL SERPL-MCNC: 84 MG/DL
TSH SERPL DL<=0.05 MIU/L-ACNC: 2.39 UIU/ML (ref 0.45–4.5)
WBC # BLD AUTO: 4.7 THOUSAND/UL (ref 4.31–10.16)

## 2024-06-08 PROCEDURE — 80053 COMPREHEN METABOLIC PANEL: CPT

## 2024-06-08 PROCEDURE — 80061 LIPID PANEL: CPT

## 2024-06-08 PROCEDURE — 84443 ASSAY THYROID STIM HORMONE: CPT

## 2024-06-08 PROCEDURE — 85025 COMPLETE CBC W/AUTO DIFF WBC: CPT

## 2024-06-08 PROCEDURE — 36415 COLL VENOUS BLD VENIPUNCTURE: CPT

## 2024-06-13 ENCOUNTER — OFFICE VISIT (OUTPATIENT)
Dept: FAMILY MEDICINE CLINIC | Facility: CLINIC | Age: 34
End: 2024-06-13
Payer: COMMERCIAL

## 2024-06-13 VITALS
DIASTOLIC BLOOD PRESSURE: 80 MMHG | BODY MASS INDEX: 27.79 KG/M2 | WEIGHT: 189.8 LBS | OXYGEN SATURATION: 98 % | HEART RATE: 57 BPM | SYSTOLIC BLOOD PRESSURE: 128 MMHG | TEMPERATURE: 98 F

## 2024-06-13 DIAGNOSIS — I10 ESSENTIAL HYPERTENSION: Primary | ICD-10-CM

## 2024-06-13 PROCEDURE — 99214 OFFICE O/P EST MOD 30 MIN: CPT

## 2024-06-13 RX ORDER — LISINOPRIL 10 MG/1
10 TABLET ORAL DAILY
Qty: 90 TABLET | Refills: 1 | Status: SHIPPED | OUTPATIENT
Start: 2024-06-13

## 2024-06-13 NOTE — PROGRESS NOTES
Ambulatory Visit  Name: Arjun Santos      : 1990      MRN: [id number]  Encounter Provider: Bhavya Bui PA-C  Encounter Date: 2024   Encounter department: [facility]    Assessment & Plan   1. Essential hypertension  Assessment & Plan:  Patient presents today for blood pressure follow-up.  His blood pressure has been running in normal range at home.  He brought his home cuff in today which seems to be about 10 points higher systolically, diastolic seems to be pretty accurate.  Manual reading today 128/80.  Patient's blood pressure appears to be well-controlled, we will continue lisinopril 10 mg.  Refills provided for him today.  Continue to monitor for any side effects and let me know if any develop.  Counseled him if he starts to become lightheaded or dizzy and starts getting low blood pressures at home (<100/60), he should let me know as we may need to back off on the lisinopril.  He is also to let me know if he starts getting readings in the 150/90 range as we would need to discuss increasing this medication.  He will follow-up with me in 6 months for a recheck, he will likely be due for physical at that point in time as well.  He may reach out sooner with any acute concerns.    Patient did receive lab work, reviewed results with him today.  Overall unremarkable, LDL is very mildly elevated, counseled on diet changes however we will continue to monitor yearly.  Orders:  -     lisinopril (ZESTRIL) 10 mg tablet; Take 1 tablet (10 mg total) by mouth daily       History of Present Illness     Patient presents today for follow-up of his blood pressure.  He has been checking his blood pressure at home and has been getting readings in the 120-130/70-80 range.  He has been doing well with lisinopril 10 mg, he has not noticed any side effects.  Denies dry cough, lightheadedness, dizziness or headaches.  Patient brought his blood pressure cuff in today to be evaluated.  Blood  pressure cuff read about 10 points higher systolic, diastolic seems to be pretty accurate with manual reading.  Patient also had lab work completed that he would like to go over.  Otherwise no concerns today.        Review of Systems   Constitutional:  Negative for chills, fatigue and fever.   Respiratory:  Negative for cough, chest tightness and shortness of breath.    Cardiovascular:  Negative for chest pain, palpitations and leg swelling.   Neurological:  Negative for dizziness, light-headedness and headaches.       Objective     /80   Pulse 57   Temp 98 °F (36.7 °C)   Wt 86.1 kg (189 lb 12.8 oz)   SpO2 98%   BMI 27.79 kg/m²     Physical Exam  Vitals and nursing note reviewed.   Constitutional:       General: He is not in acute distress.     Appearance: Normal appearance. He is normal weight. He is not ill-appearing.   HENT:      Head: Normocephalic and atraumatic.   Cardiovascular:      Rate and Rhythm: Normal rate and regular rhythm.      Pulses: Normal pulses.      Heart sounds: No murmur heard.  Pulmonary:      Effort: Pulmonary effort is normal. No respiratory distress.      Breath sounds: Normal breath sounds.   Musculoskeletal:      Right lower leg: No edema.      Left lower leg: No edema.   Skin:     General: Skin is warm and dry.   Neurological:      General: No focal deficit present.      Mental Status: He is alert and oriented to person, place, and time. Mental status is at baseline.   Psychiatric:         Mood and Affect: Mood normal.         Behavior: Behavior normal.         Judgment: Judgment normal.       Administrative Statements     Bhavya Bui PA-C  [facility]

## 2024-06-13 NOTE — ASSESSMENT & PLAN NOTE
Patient presents today for blood pressure follow-up.  His blood pressure has been running in normal range at home.  He brought his home cuff in today which seems to be about 10 points higher systolically, diastolic seems to be pretty accurate.  Manual reading today 128/80.  Patient's blood pressure appears to be well-controlled, we will continue lisinopril 10 mg.  Refills provided for him today.  Continue to monitor for any side effects and let me know if any develop.  Counseled him if he starts to become lightheaded or dizzy and starts getting low blood pressures at home (<100/60), he should let me know as we may need to back off on the lisinopril.  He is also to let me know if he starts getting readings in the 150/90 range as we would need to discuss increasing this medication.  He will follow-up with me in 6 months for a recheck, he will likely be due for physical at that point in time as well.  He may reach out sooner with any acute concerns.    Patient did receive lab work, reviewed results with him today.  Overall unremarkable, LDL is very mildly elevated, counseled on diet changes however we will continue to monitor yearly.

## 2024-12-13 ENCOUNTER — OFFICE VISIT (OUTPATIENT)
Dept: FAMILY MEDICINE CLINIC | Facility: CLINIC | Age: 34
End: 2024-12-13
Payer: COMMERCIAL

## 2024-12-13 ENCOUNTER — APPOINTMENT (OUTPATIENT)
Dept: LAB | Facility: CLINIC | Age: 34
End: 2024-12-13
Payer: COMMERCIAL

## 2024-12-13 VITALS
HEART RATE: 73 BPM | HEIGHT: 70 IN | BODY MASS INDEX: 27.46 KG/M2 | DIASTOLIC BLOOD PRESSURE: 82 MMHG | OXYGEN SATURATION: 99 % | TEMPERATURE: 97.8 F | SYSTOLIC BLOOD PRESSURE: 122 MMHG | WEIGHT: 191.8 LBS

## 2024-12-13 DIAGNOSIS — Z13.220 SCREENING FOR LIPID DISORDERS: ICD-10-CM

## 2024-12-13 DIAGNOSIS — Z13.29 SCREENING FOR THYROID DISORDER: ICD-10-CM

## 2024-12-13 DIAGNOSIS — I10 ESSENTIAL HYPERTENSION: ICD-10-CM

## 2024-12-13 DIAGNOSIS — I10 ESSENTIAL HYPERTENSION: Primary | ICD-10-CM

## 2024-12-13 LAB
ANION GAP SERPL CALCULATED.3IONS-SCNC: 7 MMOL/L (ref 4–13)
BUN SERPL-MCNC: 18 MG/DL (ref 5–25)
CALCIUM SERPL-MCNC: 9.9 MG/DL (ref 8.4–10.2)
CHLORIDE SERPL-SCNC: 105 MMOL/L (ref 96–108)
CO2 SERPL-SCNC: 29 MMOL/L (ref 21–32)
CREAT SERPL-MCNC: 1.28 MG/DL (ref 0.6–1.3)
GFR SERPL CREATININE-BSD FRML MDRD: 72 ML/MIN/1.73SQ M
GLUCOSE P FAST SERPL-MCNC: 87 MG/DL (ref 65–99)
POTASSIUM SERPL-SCNC: 5.1 MMOL/L (ref 3.5–5.3)
SODIUM SERPL-SCNC: 141 MMOL/L (ref 135–147)

## 2024-12-13 PROCEDURE — 80048 BASIC METABOLIC PNL TOTAL CA: CPT

## 2024-12-13 PROCEDURE — 99213 OFFICE O/P EST LOW 20 MIN: CPT

## 2024-12-13 PROCEDURE — 36415 COLL VENOUS BLD VENIPUNCTURE: CPT

## 2024-12-13 RX ORDER — LISINOPRIL 10 MG/1
10 TABLET ORAL DAILY
Qty: 90 TABLET | Refills: 1 | Status: SHIPPED | OUTPATIENT
Start: 2024-12-13

## 2024-12-13 NOTE — ASSESSMENT & PLAN NOTE
Doing well with lisinopril, BP is under well controlled. Continue current medication. Will recheck BMP today to check on GFR (hx of kidney stones, GFR ~70 baseline). Orders given for 6 month follow up, will complete full panel prior to physical appointment. Reach out with any concerns in the meantime. Continue to monitor BP at home. F/u if BP above >140/90 consistently at home.   Orders:    CBC and differential; Future    Comprehensive metabolic panel; Future    Basic metabolic panel; Future    lisinopril (ZESTRIL) 10 mg tablet; Take 1 tablet (10 mg total) by mouth daily

## 2024-12-13 NOTE — PROGRESS NOTES
"Name: Arjun Santos      : 1990      MRN: [id number]  Encounter Provider: Bhavya Bui PA-C  Encounter Date: 2024   Encounter department: [facility]  :  Assessment & Plan  Essential hypertension  Doing well with lisinopril, BP is under well controlled. Continue current medication. Will recheck BMP today to check on GFR (hx of kidney stones, GFR ~70 baseline). Orders given for 6 month follow up, will complete full panel prior to physical appointment. Reach out with any concerns in the meantime. Continue to monitor BP at home. F/u if BP above >140/90 consistently at home.   Orders:    CBC and differential; Future    Comprehensive metabolic panel; Future    Basic metabolic panel; Future    lisinopril (ZESTRIL) 10 mg tablet; Take 1 tablet (10 mg total) by mouth daily    Screening for thyroid disorder    Orders:    TSH, 3rd generation with Free T4 reflex; Future    Screening for lipid disorders    Orders:    Lipid Panel with Direct LDL reflex; Future           History of Present Illness     Patient presents today for BP follow up. No concerns today. Doing well with lisinopril.       Review of Systems   Constitutional:  Negative for chills, fatigue and fever.   Respiratory:  Negative for cough, chest tightness and shortness of breath.    Cardiovascular:  Negative for chest pain, palpitations and leg swelling.   Neurological:  Negative for dizziness, light-headedness and headaches.       Objective   /82   Pulse 73   Temp 97.8 °F (36.6 °C)   Ht 5' 9.5\" (1.765 m)   Wt 87 kg (191 lb 12.8 oz)   SpO2 99%   BMI 27.92 kg/m²      Physical Exam  Vitals and nursing note reviewed.   Constitutional:       General: He is not in acute distress.     Appearance: Normal appearance. He is normal weight. He is not ill-appearing.   HENT:      Head: Normocephalic and atraumatic.   Cardiovascular:      Rate and Rhythm: Normal rate and regular rhythm.      Heart sounds: No murmur " heard.  Pulmonary:      Effort: Pulmonary effort is normal. No respiratory distress.      Breath sounds: Normal breath sounds.   Musculoskeletal:      Right lower leg: No edema.      Left lower leg: No edema.   Skin:     General: Skin is warm and dry.   Neurological:      General: No focal deficit present.      Mental Status: He is alert and oriented to person, place, and time. Mental status is at baseline.   Psychiatric:         Mood and Affect: Mood normal.         Behavior: Behavior normal.         Judgment: Judgment normal.          Attending with

## 2024-12-15 ENCOUNTER — RESULTS FOLLOW-UP (OUTPATIENT)
Dept: FAMILY MEDICINE CLINIC | Facility: CLINIC | Age: 34
End: 2024-12-15

## 2025-06-16 ENCOUNTER — OFFICE VISIT (OUTPATIENT)
Dept: FAMILY MEDICINE CLINIC | Facility: CLINIC | Age: 35
End: 2025-06-16
Payer: COMMERCIAL

## 2025-06-16 VITALS
OXYGEN SATURATION: 98 % | DIASTOLIC BLOOD PRESSURE: 80 MMHG | SYSTOLIC BLOOD PRESSURE: 126 MMHG | HEIGHT: 70 IN | HEART RATE: 69 BPM | BODY MASS INDEX: 28.12 KG/M2 | WEIGHT: 196.4 LBS

## 2025-06-16 DIAGNOSIS — Z13.6 SCREENING FOR CARDIOVASCULAR CONDITION: ICD-10-CM

## 2025-06-16 DIAGNOSIS — Z13.1 SCREENING FOR DIABETES MELLITUS: ICD-10-CM

## 2025-06-16 DIAGNOSIS — Z11.4 SCREENING FOR HIV (HUMAN IMMUNODEFICIENCY VIRUS): ICD-10-CM

## 2025-06-16 DIAGNOSIS — Z13.29 SCREENING FOR THYROID DISORDER: ICD-10-CM

## 2025-06-16 DIAGNOSIS — I10 ESSENTIAL HYPERTENSION: ICD-10-CM

## 2025-06-16 DIAGNOSIS — Z11.59 NEED FOR HEPATITIS C SCREENING TEST: ICD-10-CM

## 2025-06-16 DIAGNOSIS — Z00.00 ADULT GENERAL MEDICAL EXAMINATION: Primary | ICD-10-CM

## 2025-06-16 DIAGNOSIS — Z13.0 SCREENING FOR DEFICIENCY ANEMIA: ICD-10-CM

## 2025-06-16 PROCEDURE — 99395 PREV VISIT EST AGE 18-39: CPT

## 2025-06-16 RX ORDER — LISINOPRIL 10 MG/1
10 TABLET ORAL DAILY
Qty: 90 TABLET | Refills: 1 | Status: SHIPPED | OUTPATIENT
Start: 2025-06-16

## 2025-06-16 NOTE — PROGRESS NOTES
Adult Annual Physical  Name: Arjun Santos      : 1990      MRN: [id number]  Encounter Provider: Bhavya Bui PA-C  Encounter Date: 2025   Encounter department: [facility]    :  Assessment & Plan  Adult general medical examination  Patient presents today for an annual physical. Patient's medical history and medication list were reviewed and updated. Annual physical questionnaire was given to review current diet, exercise level, sleep health, dental health, visual health, hearing status.    Preventative health needs were reviewed and assessed today:   Immunizations:   Flu -out of season  COVID -defers  Tdap -up-to-date, due 2026    No other preventative health needs at this time.  Lab orders placed-CBC, CMP, TSH, lipid panel, hep C and HIV screening.    Physical examination unremarkable today, patient appears to be in very good health for his age and is very active with a healthy diet.  His blood pressure is under good control with current lisinopril.  The remainder of his vitals are stable.  Encouraged regular vision and dental appointments as necessary.    Follow-up in 6 months for blood pressure recheck.  Will get fasting lab work completed within the next few weeks.       Essential hypertension  Doing well with lisinopril, BP is under well controlled. Continue current medication.  Orders placed again for regular lab work including CBC, CMP, TSH and lipid panel.. Reach out with any concerns in the meantime. Continue to monitor BP at home. F/u if BP above >140/90 consistently at home.   Orders:  •  lisinopril (ZESTRIL) 10 mg tablet; Take 1 tablet (10 mg total) by mouth daily     Screening for deficiency anemia    Orders:  •  CBC and differential; Future    Screening for diabetes mellitus    Orders:  •  Comprehensive metabolic panel; Future    Screening for thyroid disorder    Orders:  •  TSH, 3rd generation with Free T4 reflex; Future    Screening for  cardiovascular condition    Orders:  •  Lipid Panel with Direct LDL reflex; Future    Need for hepatitis C screening test    Orders:  •  Hepatitis C Antibody; Future    Screening for HIV (human immunodeficiency virus)    Orders:  •  HIV 1/2 AG/AB w Reflex [facility] for 2 yr old and above; Future        Preventive Screenings:  - Diabetes Screening: screening up-to-date  - Cholesterol Screening: orders placed   - Hepatitis C screening: orders placed   - HIV screening: orders placed   - Colon cancer screening: screening not indicated   - Lung cancer screening: screening not indicated   - Prostate cancer screening: screening not indicated     Counseling/Anticipatory Guidance:  - Alcohol: discussed moderation in alcohol intake and recommendations for healthy alcohol use.   - Dental health: discussed importance of regular tooth brushing, flossing, and dental visits.   - Diet: discussed recommendations for a healthy/well-balanced diet.   - Exercise: the importance of regular exercise/physical activity was discussed. Recommend exercise 3-5 times per week for at least 30 minutes.          History of Present Illness     Adult Annual Physical:  Patient presents for annual physical. Has been feeling well since last visit with no concerns.  Compliant with medications.  Has not been checking blood pressure at home..     Diet and Physical Activity:  - Diet/Nutrition: well balanced diet.  - Exercise: 5-7 times a week on average, 30-60 minutes on average, moderate cardiovascular exercise and strength training exercises.    Depression Screening:  - PHQ-2 Score: 0    General Health:  - Sleep: 4-6 hours of sleep on average and snores loudly.  - Hearing: normal hearing bilateral ears.  - Vision: wears glasses and most recent eye exam > 1 year ago.  - Dental: no dental visits for > 1 year, brushes teeth once daily and floss regularly.     Health:  - History of STDs: no.   - Urinary symptoms: none.     Advanced Care Planning:  - Has an  "advanced directive?: no    - Has a durable medical POA?: no    - ACP document given to patient?: no      Review of Systems   Constitutional:  Negative for chills and fever.   HENT:  Negative for congestion, sinus pressure, sore throat and trouble swallowing.    Respiratory:  Negative for cough, chest tightness and shortness of breath.    Cardiovascular:  Negative for chest pain, palpitations and leg swelling.   Gastrointestinal:  Negative for abdominal pain, diarrhea, nausea and vomiting.   Genitourinary:  Negative for difficulty urinating, dysuria and hematuria.   Musculoskeletal:  Negative for arthralgias, back pain and myalgias.   Neurological:  Negative for dizziness, seizures, syncope, light-headedness and headaches.   Hematological:  Does not bruise/bleed easily.   Psychiatric/Behavioral:  Negative for behavioral problems and confusion. The patient is not nervous/anxious.    All other systems reviewed and are negative.        Objective   /80   Pulse 69   Ht 5' 9.5\" (1.765 m)   Wt 89.1 kg (196 lb 6.4 oz)   SpO2 98%   BMI 28.59 kg/m²     Physical Exam  Vitals and nursing note reviewed.   Constitutional:       General: He is not in acute distress.     Appearance: Normal appearance. He is normal weight. He is not ill-appearing.   HENT:      Head: Normocephalic and atraumatic.      Right Ear: Tympanic membrane normal.      Left Ear: Tympanic membrane normal.      Nose: Nose normal.      Mouth/Throat:      Mouth: Mucous membranes are moist.      Pharynx: Oropharynx is clear. No oropharyngeal exudate or posterior oropharyngeal erythema.     Eyes:      Extraocular Movements: Extraocular movements intact.      Pupils: Pupils are equal, round, and reactive to light.     Neck:      Vascular: No carotid bruit.     Cardiovascular:      Rate and Rhythm: Normal rate and regular rhythm.      Heart sounds: No murmur heard.  Pulmonary:      Effort: Pulmonary effort is normal. No respiratory distress.      Breath " sounds: Normal breath sounds.   Abdominal:      General: Bowel sounds are normal.      Palpations: Abdomen is soft.      Tenderness: There is no abdominal tenderness.     Musculoskeletal:         General: Normal range of motion.      Cervical back: Normal range of motion and neck supple.      Right lower leg: No edema.      Left lower leg: No edema.   Lymphadenopathy:      Cervical: No cervical adenopathy.     Skin:     General: Skin is warm and dry.     Neurological:      General: No focal deficit present.      Mental Status: He is alert and oriented to person, place, and time. Mental status is at baseline.     Psychiatric:         Mood and Affect: Mood normal.         Behavior: Behavior normal.         Judgment: Judgment normal.

## 2025-06-16 NOTE — ASSESSMENT & PLAN NOTE
Doing well with lisinopril, BP is under well controlled. Continue current medication.  Orders placed again for regular lab work including CBC, CMP, TSH and lipid panel.. Reach out with any concerns in the meantime. Continue to monitor BP at home. F/u if BP above >140/90 consistently at home.   Orders:  •  lisinopril (ZESTRIL) 10 mg tablet; Take 1 tablet (10 mg total) by mouth daily

## 2025-07-25 ENCOUNTER — APPOINTMENT (OUTPATIENT)
Dept: LAB | Facility: CLINIC | Age: 35
End: 2025-07-25
Payer: COMMERCIAL

## 2025-07-25 DIAGNOSIS — Z13.0 SCREENING FOR DEFICIENCY ANEMIA: ICD-10-CM

## 2025-07-25 DIAGNOSIS — Z11.59 NEED FOR HEPATITIS C SCREENING TEST: ICD-10-CM

## 2025-07-25 DIAGNOSIS — Z11.4 SCREENING FOR HIV (HUMAN IMMUNODEFICIENCY VIRUS): ICD-10-CM

## 2025-07-25 DIAGNOSIS — Z13.1 SCREENING FOR DIABETES MELLITUS: ICD-10-CM

## 2025-07-25 DIAGNOSIS — Z13.6 SCREENING FOR CARDIOVASCULAR CONDITION: ICD-10-CM

## 2025-07-25 DIAGNOSIS — Z13.29 SCREENING FOR THYROID DISORDER: ICD-10-CM

## 2025-07-25 LAB
ALBUMIN SERPL BCG-MCNC: 4.2 G/DL (ref 3.5–5)
ALP SERPL-CCNC: 53 U/L (ref 34–104)
ALT SERPL W P-5'-P-CCNC: 22 U/L (ref 7–52)
ANION GAP SERPL CALCULATED.3IONS-SCNC: 8 MMOL/L (ref 4–13)
AST SERPL W P-5'-P-CCNC: 22 U/L (ref 13–39)
BASOPHILS # BLD AUTO: 0.02 THOUSANDS/ÂΜL (ref 0–0.1)
BASOPHILS NFR BLD AUTO: 1 % (ref 0–1)
BILIRUB SERPL-MCNC: 0.56 MG/DL (ref 0.2–1)
BUN SERPL-MCNC: 16 MG/DL (ref 5–25)
CALCIUM SERPL-MCNC: 9.2 MG/DL (ref 8.4–10.2)
CHLORIDE SERPL-SCNC: 106 MMOL/L (ref 96–108)
CHOLEST SERPL-MCNC: 193 MG/DL (ref ?–200)
CO2 SERPL-SCNC: 26 MMOL/L (ref 21–32)
CREAT SERPL-MCNC: 1.2 MG/DL (ref 0.6–1.3)
EOSINOPHIL # BLD AUTO: 0.05 THOUSAND/ÂΜL (ref 0–0.61)
EOSINOPHIL NFR BLD AUTO: 1 % (ref 0–6)
ERYTHROCYTE [DISTWIDTH] IN BLOOD BY AUTOMATED COUNT: 12.1 % (ref 11.6–15.1)
GFR SERPL CREATININE-BSD FRML MDRD: 77 ML/MIN/1.73SQ M
GLUCOSE P FAST SERPL-MCNC: 90 MG/DL (ref 65–99)
HCT VFR BLD AUTO: 43.2 % (ref 36.5–49.3)
HCV AB SER QL: NORMAL
HDLC SERPL-MCNC: 49 MG/DL
HGB BLD-MCNC: 13.6 G/DL (ref 12–17)
HIV 1+2 AB+HIV1 P24 AG SERPL QL IA: NORMAL
IMM GRANULOCYTES # BLD AUTO: 0.01 THOUSAND/UL (ref 0–0.2)
IMM GRANULOCYTES NFR BLD AUTO: 0 % (ref 0–2)
LDLC SERPL CALC-MCNC: 127 MG/DL (ref 0–100)
LYMPHOCYTES # BLD AUTO: 1.84 THOUSANDS/ÂΜL (ref 0.6–4.47)
LYMPHOCYTES NFR BLD AUTO: 46 % (ref 14–44)
MCH RBC QN AUTO: 27.9 PG (ref 26.8–34.3)
MCHC RBC AUTO-ENTMCNC: 31.5 G/DL (ref 31.4–37.4)
MCV RBC AUTO: 89 FL (ref 82–98)
MONOCYTES # BLD AUTO: 0.27 THOUSAND/ÂΜL (ref 0.17–1.22)
MONOCYTES NFR BLD AUTO: 7 % (ref 4–12)
NEUTROPHILS # BLD AUTO: 1.83 THOUSANDS/ÂΜL (ref 1.85–7.62)
NEUTS SEG NFR BLD AUTO: 45 % (ref 43–75)
NRBC BLD AUTO-RTO: 0 /100 WBCS
PLATELET # BLD AUTO: 194 THOUSANDS/UL (ref 149–390)
PMV BLD AUTO: 11.1 FL (ref 8.9–12.7)
POTASSIUM SERPL-SCNC: 4.3 MMOL/L (ref 3.5–5.3)
PROT SERPL-MCNC: 7 G/DL (ref 6.4–8.4)
RBC # BLD AUTO: 4.88 MILLION/UL (ref 3.88–5.62)
SODIUM SERPL-SCNC: 140 MMOL/L (ref 135–147)
TRIGL SERPL-MCNC: 84 MG/DL (ref ?–150)
TSH SERPL DL<=0.05 MIU/L-ACNC: 1.36 UIU/ML (ref 0.45–4.5)
WBC # BLD AUTO: 4.02 THOUSAND/UL (ref 4.31–10.16)

## 2025-07-25 PROCEDURE — 80053 COMPREHEN METABOLIC PANEL: CPT

## 2025-07-25 PROCEDURE — 84443 ASSAY THYROID STIM HORMONE: CPT

## 2025-07-25 PROCEDURE — 80061 LIPID PANEL: CPT

## 2025-07-25 PROCEDURE — 87389 HIV-1 AG W/HIV-1&-2 AB AG IA: CPT

## 2025-07-25 PROCEDURE — 36415 COLL VENOUS BLD VENIPUNCTURE: CPT

## 2025-07-25 PROCEDURE — 85025 COMPLETE CBC W/AUTO DIFF WBC: CPT

## 2025-07-25 PROCEDURE — 86803 HEPATITIS C AB TEST: CPT

## (undated) DEVICE — CHLORAPREP HI-LITE 26ML ORANGE

## (undated) DEVICE — GLOVE SRG BIOGEL 6.5

## (undated) DEVICE — SCD SEQUENTIAL COMPRESSION COMFORT SLEEVE MEDIUM KNEE LENGTH: Brand: KENDALL SCD

## (undated) DEVICE — PLUMEPEN PRO 10FT

## (undated) DEVICE — ELECTRODE BLADE MOD E-Z CLEAN  2.75IN 7CM -0012AM

## (undated) DEVICE — BETHLEHEM UNIVERSAL OUTPATIENT: Brand: CARDINAL HEALTH

## (undated) DEVICE — 10FR FRAZIER SUCTION HANDLE: Brand: CARDINAL HEALTH

## (undated) DEVICE — TUBING SUCTION 5MM X 12 FT

## (undated) DEVICE — SUT VICRYL 3-0 SH 27 IN J416H

## (undated) DEVICE — GLOVE SRG BIOGEL ECLIPSE 7.5

## (undated) DEVICE — SUT MONOCRYL 4-0 PS-2 27 IN Y426H

## (undated) DEVICE — PACK UNIVERSAL DRAPES SUB-Q ICD

## (undated) DEVICE — ELECTRODE NEEDLE MOD E-Z CLEAN 2.75IN 7CM -0013M

## (undated) DEVICE — INTENDED FOR TISSUE SEPARATION, AND OTHER PROCEDURES THAT REQUIRE A SHARP SURGICAL BLADE TO PUNCTURE OR CUT.: Brand: BARD-PARKER SAFETY BLADES SIZE 15, STERILE

## (undated) DEVICE — PROXIMATE SKIN STAPLERS (35 WIDE) CONTAINS 35 STAINLESS STEEL STAPLES (FIXED HEAD): Brand: PROXIMATE

## (undated) DEVICE — ADHESIVE SKIN HIGH VISCOSITY EXOFIN 1ML